# Patient Record
Sex: FEMALE | Race: WHITE | NOT HISPANIC OR LATINO
[De-identification: names, ages, dates, MRNs, and addresses within clinical notes are randomized per-mention and may not be internally consistent; named-entity substitution may affect disease eponyms.]

---

## 2021-01-08 ENCOUNTER — TRANSCRIPTION ENCOUNTER (OUTPATIENT)
Age: 31
End: 2021-01-08

## 2021-06-04 ENCOUNTER — TRANSCRIPTION ENCOUNTER (OUTPATIENT)
Age: 31
End: 2021-06-04

## 2021-10-08 ENCOUNTER — NON-APPOINTMENT (OUTPATIENT)
Age: 31
End: 2021-10-08

## 2021-10-08 ENCOUNTER — APPOINTMENT (OUTPATIENT)
Dept: FAMILY MEDICINE | Facility: CLINIC | Age: 31
End: 2021-10-08
Payer: COMMERCIAL

## 2021-10-08 VITALS
BODY MASS INDEX: 29.64 KG/M2 | HEART RATE: 82 BPM | RESPIRATION RATE: 15 BRPM | OXYGEN SATURATION: 98 % | WEIGHT: 151 LBS | DIASTOLIC BLOOD PRESSURE: 82 MMHG | HEIGHT: 60 IN | TEMPERATURE: 97.4 F | SYSTOLIC BLOOD PRESSURE: 120 MMHG

## 2021-10-08 DIAGNOSIS — Z80.0 FAMILY HISTORY OF MALIGNANT NEOPLASM OF DIGESTIVE ORGANS: ICD-10-CM

## 2021-10-08 DIAGNOSIS — Z12.4 ENCOUNTER FOR SCREENING FOR MALIGNANT NEOPLASM OF CERVIX: ICD-10-CM

## 2021-10-08 DIAGNOSIS — I49.49 OTHER PREMATURE DEPOLARIZATION: ICD-10-CM

## 2021-10-08 DIAGNOSIS — Z80.42 FAMILY HISTORY OF MALIGNANT NEOPLASM OF PROSTATE: ICD-10-CM

## 2021-10-08 DIAGNOSIS — Z60.2 PROBLEMS RELATED TO LIVING ALONE: ICD-10-CM

## 2021-10-08 DIAGNOSIS — Z80.6 FAMILY HISTORY OF LEUKEMIA: ICD-10-CM

## 2021-10-08 DIAGNOSIS — F41.9 ANXIETY DISORDER, UNSPECIFIED: ICD-10-CM

## 2021-10-08 DIAGNOSIS — Z11.3 ENCOUNTER FOR SCREENING FOR INFECTIONS WITH A PREDOMINANTLY SEXUAL MODE OF TRANSMISSION: ICD-10-CM

## 2021-10-08 DIAGNOSIS — Z80.1 FAMILY HISTORY OF MALIGNANT NEOPLASM OF TRACHEA, BRONCHUS AND LUNG: ICD-10-CM

## 2021-10-08 DIAGNOSIS — R53.83 OTHER FATIGUE: ICD-10-CM

## 2021-10-08 DIAGNOSIS — Z80.8 FAMILY HISTORY OF MALIGNANT NEOPLASM OF OTHER ORGANS OR SYSTEMS: ICD-10-CM

## 2021-10-08 DIAGNOSIS — K64.4 RESIDUAL HEMORRHOIDAL SKIN TAGS: ICD-10-CM

## 2021-10-08 PROBLEM — Z00.00 ENCOUNTER FOR PREVENTIVE HEALTH EXAMINATION: Status: ACTIVE | Noted: 2021-10-08

## 2021-10-08 PROCEDURE — 99204 OFFICE O/P NEW MOD 45 MIN: CPT | Mod: 25

## 2021-10-08 PROCEDURE — 93000 ELECTROCARDIOGRAM COMPLETE: CPT

## 2021-10-08 RX ORDER — BUPROPION HYDROCHLORIDE 150 MG/1
150 TABLET, FILM COATED ORAL
Refills: 0 | Status: ACTIVE | COMMUNITY

## 2021-10-08 SDOH — SOCIAL STABILITY - SOCIAL INSECURITY: PROBLEMS RELATED TO LIVING ALONE: Z60.2

## 2021-10-08 NOTE — HISTORY OF PRESENT ILLNESS
[FreeTextEntry8] : Here to establish. She has h/o anxiety and is being seen by psychiatrist dr. Ashford. She is AAOX3,NAD. She owns [employer]. She has external hemorrhoid and has off and on vaginal discharge . No pelvic pain . She needs to establish with GYN. She has left sided hip pain for few weeks. She has seen pain management and had xray and MRI left hip . She is doing Yoga and Pilates, LidocaIne topically helps along with Ice.

## 2021-10-08 NOTE — HEALTH RISK ASSESSMENT
[No] : In the past 12 months have you used drugs other than those required for medical reasons? No [No falls in past year] : Patient reported no falls in the past year [0] : 2) Feeling down, depressed, or hopeless: Not at all (0) [] : No [de-identified] : walking, yoga  [de-identified] : vegetarian  [[id number]] : 0

## 2021-10-11 LAB
ALBUMIN SERPL ELPH-MCNC: 4.8 G/DL
ALP BLD-CCNC: 47 U/L
ALT SERPL-CCNC: 11 U/L
ANION GAP SERPL CALC-SCNC: 17 MMOL/L
APPEARANCE: CLEAR
AST SERPL-CCNC: 9 U/L
BACTERIA: NEGATIVE
BASOPHILS # BLD AUTO: 0.06 K/UL
BASOPHILS NFR BLD AUTO: 0.8 %
BILIRUB SERPL-MCNC: 0.9 MG/DL
BILIRUBIN URINE: NEGATIVE
BLOOD URINE: NEGATIVE
BUN SERPL-MCNC: 13 MG/DL
CALCIUM SERPL-MCNC: 9.4 MG/DL
CHLORIDE SERPL-SCNC: 101 MMOL/L
CHOLEST SERPL-MCNC: 169 MG/DL
CO2 SERPL-SCNC: 20 MMOL/L
COLOR: YELLOW
CREAT SERPL-MCNC: 0.68 MG/DL
EOSINOPHIL # BLD AUTO: 0.08 K/UL
EOSINOPHIL NFR BLD AUTO: 1 %
ESTIMATED AVERAGE GLUCOSE: 100 MG/DL
FERRITIN SERPL-MCNC: 14 NG/ML
FOLATE SERPL-MCNC: 14 NG/ML
GLUCOSE QUALITATIVE U: NEGATIVE
GLUCOSE SERPL-MCNC: 82 MG/DL
HBA1C MFR BLD HPLC: 5.1 %
HCT VFR BLD CALC: 38.2 %
HCV AB SER QL: NONREACTIVE
HCV S/CO RATIO: 0.16 S/CO
HDLC SERPL-MCNC: 66 MG/DL
HGB BLD-MCNC: 12.1 G/DL
HIV1+2 AB SPEC QL IA.RAPID: NONREACTIVE
HYALINE CASTS: 3 /LPF
IMM GRANULOCYTES NFR BLD AUTO: 0.1 %
IRON SATN MFR SERPL: 36 %
IRON SERPL-MCNC: 108 UG/DL
KETONES URINE: ABNORMAL
LDLC SERPL CALC-MCNC: 93 MG/DL
LEUKOCYTE ESTERASE URINE: NEGATIVE
LYMPHOCYTES # BLD AUTO: 2.04 K/UL
LYMPHOCYTES NFR BLD AUTO: 25.6 %
MAGNESIUM SERPL-MCNC: 2.1 MG/DL
MAN DIFF?: NORMAL
MCHC RBC-ENTMCNC: 28.3 PG
MCHC RBC-ENTMCNC: 31.7 GM/DL
MCV RBC AUTO: 89.5 FL
MICROSCOPIC-UA: NORMAL
MONOCYTES # BLD AUTO: 0.57 K/UL
MONOCYTES NFR BLD AUTO: 7.1 %
NEUTROPHILS # BLD AUTO: 5.22 K/UL
NEUTROPHILS NFR BLD AUTO: 65.4 %
NITRITE URINE: NEGATIVE
NONHDLC SERPL-MCNC: 102 MG/DL
PH URINE: 6
PLATELET # BLD AUTO: 332 K/UL
POTASSIUM SERPL-SCNC: 4.6 MMOL/L
PROT SERPL-MCNC: 7.1 G/DL
PROTEIN URINE: NEGATIVE
RBC # BLD: 4.27 M/UL
RBC # FLD: 13.3 %
RED BLOOD CELLS URINE: 1 /HPF
SODIUM SERPL-SCNC: 139 MMOL/L
SPECIFIC GRAVITY URINE: 1.03
SQUAMOUS EPITHELIAL CELLS: 11 /HPF
TIBC SERPL-MCNC: 299 UG/DL
TRIGL SERPL-MCNC: 45 MG/DL
TSH SERPL-ACNC: 1.56 UIU/ML
UIBC SERPL-MCNC: 191 UG/DL
UROBILINOGEN URINE: NORMAL
VIT B12 SERPL-MCNC: 264 PG/ML
WBC # FLD AUTO: 7.98 K/UL
WHITE BLOOD CELLS URINE: 9 /HPF

## 2021-10-14 ENCOUNTER — APPOINTMENT (OUTPATIENT)
Dept: FAMILY MEDICINE | Facility: CLINIC | Age: 31
End: 2021-10-14
Payer: COMMERCIAL

## 2021-10-14 ENCOUNTER — MED ADMIN CHARGE (OUTPATIENT)
Age: 31
End: 2021-10-14

## 2021-10-14 PROCEDURE — 96372 THER/PROPH/DIAG INJ SC/IM: CPT

## 2021-10-14 RX ORDER — CYANOCOBALAMIN 1000 UG/ML
1000 INJECTION INTRAMUSCULAR; SUBCUTANEOUS
Qty: 0 | Refills: 0 | Status: COMPLETED | OUTPATIENT
Start: 2021-10-14

## 2021-10-14 RX ADMIN — CYANOCOBALAMIN 0 MCG/ML: 1000 INJECTION, SOLUTION INTRAMUSCULAR at 00:00

## 2021-10-21 ENCOUNTER — APPOINTMENT (OUTPATIENT)
Dept: FAMILY MEDICINE | Facility: CLINIC | Age: 31
End: 2021-10-21
Payer: COMMERCIAL

## 2021-10-21 ENCOUNTER — MED ADMIN CHARGE (OUTPATIENT)
Age: 31
End: 2021-10-21

## 2021-10-21 PROCEDURE — 96372 THER/PROPH/DIAG INJ SC/IM: CPT

## 2021-10-21 RX ORDER — CYANOCOBALAMIN 1000 UG/ML
1000 INJECTION INTRAMUSCULAR; SUBCUTANEOUS
Qty: 0 | Refills: 0 | Status: COMPLETED | OUTPATIENT
Start: 2021-10-21

## 2021-10-21 RX ADMIN — CYANOCOBALAMIN 0 MCG/ML: 1000 INJECTION, SOLUTION INTRAMUSCULAR at 00:00

## 2021-10-27 ENCOUNTER — APPOINTMENT (OUTPATIENT)
Dept: FAMILY MEDICINE | Facility: CLINIC | Age: 31
End: 2021-10-27

## 2021-11-11 ENCOUNTER — APPOINTMENT (OUTPATIENT)
Dept: FAMILY MEDICINE | Facility: CLINIC | Age: 31
End: 2021-11-11
Payer: COMMERCIAL

## 2021-11-11 ENCOUNTER — NON-APPOINTMENT (OUTPATIENT)
Age: 31
End: 2021-11-11

## 2021-11-11 DIAGNOSIS — D64.9 ANEMIA, UNSPECIFIED: ICD-10-CM

## 2021-11-11 DIAGNOSIS — E53.8 DEFICIENCY OF OTHER SPECIFIED B GROUP VITAMINS: ICD-10-CM

## 2021-11-11 PROCEDURE — 96372 THER/PROPH/DIAG INJ SC/IM: CPT

## 2021-11-12 ENCOUNTER — APPOINTMENT (OUTPATIENT)
Dept: OBGYN | Facility: CLINIC | Age: 31
End: 2021-11-12

## 2021-11-15 ENCOUNTER — APPOINTMENT (OUTPATIENT)
Dept: SURGERY | Facility: CLINIC | Age: 31
End: 2021-11-15

## 2021-11-19 ENCOUNTER — NON-APPOINTMENT (OUTPATIENT)
Age: 31
End: 2021-11-19

## 2021-11-19 ENCOUNTER — APPOINTMENT (OUTPATIENT)
Dept: OBGYN | Facility: CLINIC | Age: 31
End: 2021-11-19
Payer: COMMERCIAL

## 2021-11-19 ENCOUNTER — APPOINTMENT (OUTPATIENT)
Dept: FAMILY MEDICINE | Facility: CLINIC | Age: 31
End: 2021-11-19
Payer: COMMERCIAL

## 2021-11-19 VITALS
SYSTOLIC BLOOD PRESSURE: 110 MMHG | HEIGHT: 62 IN | HEART RATE: 75 BPM | TEMPERATURE: 97.3 F | BODY MASS INDEX: 28.16 KG/M2 | DIASTOLIC BLOOD PRESSURE: 70 MMHG | OXYGEN SATURATION: 98 % | WEIGHT: 153 LBS

## 2021-11-19 DIAGNOSIS — Z01.419 ENCOUNTER FOR GYNECOLOGICAL EXAMINATION (GENERAL) (ROUTINE) W/OUT ABNORMAL FINDINGS: ICD-10-CM

## 2021-11-19 DIAGNOSIS — N39.0 URINARY TRACT INFECTION, SITE NOT SPECIFIED: ICD-10-CM

## 2021-11-19 DIAGNOSIS — N94.10 UNSPECIFIED DYSPAREUNIA: ICD-10-CM

## 2021-11-19 PROCEDURE — 99385 PREV VISIT NEW AGE 18-39: CPT

## 2021-11-19 PROCEDURE — 96372 THER/PROPH/DIAG INJ SC/IM: CPT

## 2021-11-19 RX ORDER — CYANOCOBALAMIN 1000 UG/ML
1000 INJECTION INTRAMUSCULAR; SUBCUTANEOUS
Qty: 0 | Refills: 0 | Status: COMPLETED | OUTPATIENT
Start: 2021-11-19

## 2021-11-19 RX ORDER — DESVENLAFAXINE SUCCINATE 25 MG/1
25 TABLET, EXTENDED RELEASE ORAL
Refills: 0 | Status: DISCONTINUED | COMMUNITY
End: 2021-11-19

## 2021-11-19 RX ORDER — IBUPROFEN 200 MG/1
TABLET, COATED ORAL
Refills: 0 | Status: DISCONTINUED | COMMUNITY
End: 2021-11-19

## 2021-11-19 RX ADMIN — CYANOCOBALAMIN 0 MCG/ML: 1000 INJECTION INTRAMUSCULAR; SUBCUTANEOUS at 00:00
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
## 2021-11-19 NOTE — PLAN
[FreeTextEntry1] : \par \par pt to return for first gardasil injection\par \par I spent the time noted on the day of this patient encounter preparing for, providing and documenting the above E/M service and counseling and educate patient on differential, workup, disease course, and treatment/management. Education was provided to the patient during this encounter. All questions and concerns were answered and addressed in detail.\par \par Zulay Pierre MD\par

## 2021-11-19 NOTE — REVIEW OF SYSTEMS
[Pelvic pain] : pelvic pain [Genital Rash/Irritation] : genital rash/irritation [Negative] : Gastrointestinal

## 2021-11-22 LAB
C TRACH RRNA SPEC QL NAA+PROBE: NOT DETECTED
HBV SURFACE AG SER QL: NONREACTIVE
HCV RNA SERPL NAA DL=5-ACNC: NOT DETECTED IU/ML
HCV RNA SERPL NAA+PROBE-LOG IU: NOT DETECTED LOG10IU/ML
HIV1+2 AB SPEC QL IA.RAPID: NONREACTIVE
N GONORRHOEA RRNA SPEC QL NAA+PROBE: NOT DETECTED
SOURCE AMPLIFICATION: NORMAL
T PALLIDUM AB SER QL IA: NEGATIVE

## 2021-11-29 ENCOUNTER — APPOINTMENT (OUTPATIENT)
Dept: SURGERY | Facility: CLINIC | Age: 31
End: 2021-11-29

## 2021-11-30 ENCOUNTER — NON-APPOINTMENT (OUTPATIENT)
Age: 31
End: 2021-11-30

## 2021-12-03 ENCOUNTER — APPOINTMENT (OUTPATIENT)
Dept: FAMILY MEDICINE | Facility: CLINIC | Age: 31
End: 2021-12-03

## 2021-12-09 ENCOUNTER — APPOINTMENT (OUTPATIENT)
Dept: ULTRASOUND IMAGING | Facility: HOSPITAL | Age: 31
End: 2021-12-09

## 2021-12-16 ENCOUNTER — OUTPATIENT (OUTPATIENT)
Dept: OUTPATIENT SERVICES | Facility: HOSPITAL | Age: 31
LOS: 1 days | Discharge: HOME | End: 2021-12-16

## 2021-12-16 DIAGNOSIS — D64.9 ANEMIA, UNSPECIFIED: ICD-10-CM

## 2021-12-16 DIAGNOSIS — E61.1 IRON DEFICIENCY: ICD-10-CM

## 2021-12-17 ENCOUNTER — APPOINTMENT (OUTPATIENT)
Dept: FAMILY MEDICINE | Facility: CLINIC | Age: 31
End: 2021-12-17

## 2021-12-17 DIAGNOSIS — N76.0 ACUTE VAGINITIS: ICD-10-CM

## 2021-12-17 DIAGNOSIS — B96.89 ACUTE VAGINITIS: ICD-10-CM

## 2021-12-17 LAB
CANDIDA VAG CYTO: NOT DETECTED
CYTOLOGY CVX/VAG DOC THIN PREP: ABNORMAL
G VAGINALIS+PREV SP MTYP VAG QL MICRO: DETECTED
HPV HIGH+LOW RISK DNA PNL CVX: DETECTED
T VAGINALIS VAG QL WET PREP: NOT DETECTED

## 2021-12-20 ENCOUNTER — RESULT REVIEW (OUTPATIENT)
Age: 31
End: 2021-12-20

## 2021-12-20 ENCOUNTER — NON-APPOINTMENT (OUTPATIENT)
Age: 31
End: 2021-12-20

## 2021-12-20 ENCOUNTER — APPOINTMENT (OUTPATIENT)
Age: 31
End: 2021-12-20
Payer: COMMERCIAL

## 2021-12-20 VITALS
HEIGHT: 61.26 IN | HEART RATE: 71 BPM | BODY MASS INDEX: 28.88 KG/M2 | DIASTOLIC BLOOD PRESSURE: 72 MMHG | OXYGEN SATURATION: 99 % | WEIGHT: 154.96 LBS | TEMPERATURE: 97.9 F | SYSTOLIC BLOOD PRESSURE: 113 MMHG | RESPIRATION RATE: 14 BRPM

## 2021-12-20 DIAGNOSIS — R79.0 ABNORMAL LVL OF BLOOD MINERAL: ICD-10-CM

## 2021-12-20 LAB
BASOPHILS # BLD AUTO: 0.06 K/UL — SIGNIFICANT CHANGE UP (ref 0–0.2)
BASOPHILS NFR BLD AUTO: 1 % — SIGNIFICANT CHANGE UP (ref 0–2)
EOSINOPHIL # BLD AUTO: 0.06 K/UL — SIGNIFICANT CHANGE UP (ref 0–0.5)
EOSINOPHIL NFR BLD AUTO: 1 % — SIGNIFICANT CHANGE UP (ref 0–6)
HCT VFR BLD CALC: 42.1 % — SIGNIFICANT CHANGE UP (ref 34.5–45)
HGB BLD-MCNC: 13.4 G/DL — SIGNIFICANT CHANGE UP (ref 11.5–15.5)
IMM GRANULOCYTES NFR BLD AUTO: 0.2 % — SIGNIFICANT CHANGE UP (ref 0–1.5)
LYMPHOCYTES # BLD AUTO: 1.31 K/UL — SIGNIFICANT CHANGE UP (ref 1–3.3)
LYMPHOCYTES # BLD AUTO: 21.8 % — SIGNIFICANT CHANGE UP (ref 13–44)
MCHC RBC-ENTMCNC: 28.4 PG — SIGNIFICANT CHANGE UP (ref 27–34)
MCHC RBC-ENTMCNC: 31.8 G/DL — LOW (ref 32–36)
MCV RBC AUTO: 89.2 FL — SIGNIFICANT CHANGE UP (ref 80–100)
MONOCYTES # BLD AUTO: 0.42 K/UL — SIGNIFICANT CHANGE UP (ref 0–0.9)
MONOCYTES NFR BLD AUTO: 7 % — SIGNIFICANT CHANGE UP (ref 2–14)
NEUTROPHILS # BLD AUTO: 4.14 K/UL — SIGNIFICANT CHANGE UP (ref 1.8–7.4)
NEUTROPHILS NFR BLD AUTO: 69 % — SIGNIFICANT CHANGE UP (ref 43–77)
NRBC # BLD: 0 /100 WBCS — SIGNIFICANT CHANGE UP (ref 0–0)
PLATELET # BLD AUTO: 331 K/UL — SIGNIFICANT CHANGE UP (ref 150–400)
RBC # BLD: 4.72 M/UL — SIGNIFICANT CHANGE UP (ref 3.8–5.2)
RBC # FLD: 12.8 % — SIGNIFICANT CHANGE UP (ref 10.3–14.5)
WBC # BLD: 6 K/UL — SIGNIFICANT CHANGE UP (ref 3.8–10.5)
WBC # FLD AUTO: 6 K/UL — SIGNIFICANT CHANGE UP (ref 3.8–10.5)

## 2021-12-20 PROCEDURE — 99204 OFFICE O/P NEW MOD 45 MIN: CPT

## 2021-12-20 NOTE — CONSULT LETTER
[Dear  ___] : Dear ~ANTON, [Consult Letter:] : I had the pleasure of evaluating your patient, [unfilled]. [Consult Closing:] : Thank you very much for allowing me to participate in the care of this patient.  If you have any questions, please do not hesitate to contact me. [Please see my note below.] : Please see my note below. [Sincerely,] : Sincerely, [FreeTextEntry2] : Shelbi Moody MD [FreeTextEntry3] : СЕРГЕЙ AYON M.D.\par Hematology/ Oncology\par [facility] \par [facility]\par [address]\par [place], [place] [address]\par Telephone: [phone]\par Fax: [phone]\par \par \par \par \par \par

## 2021-12-20 NOTE — HISTORY OF PRESENT ILLNESS
[de-identified] : Ms. CYR is a 31 premenopausal female, never smoker, vegetarian, with PMHx of external hemorrhoids, recent diagnosis of bacterial vaginosis, anxiety/ depression since adolescence (under the care of Dr. Ashford–psychiatry), referred for hematologic consultation of anemia with low ferritin level ( hemoglobin 12 g/dL, serum ferritin 14 ng/mL). Patient is endorsing regular menses of normal amount of blood loss, no associated dizziness or fatigue. She is not on oral iron supplementation. Denies recent PRBC transfusion or parenteral iron supplementation.  Did not have EGD/ colonoscopy ; reports family history of leukemia in one uncle, and significant psychiatric history (mother and maternal aunt with bipolar disorder, father with anxiety, a twin sister with schizophrenia).  Patient is single, works as a , denies alcohol or drug use.  Today's hemoglobin at 13.4 g/dL. [FreeTextEntry1] : expectant surveillance\par \par

## 2021-12-20 NOTE — ASSESSMENT
[FreeTextEntry1] : Ms. CYR 's questions were answered to her satisfaction. \par She  expressed her  understanding and willingness to comply with the above recommendations, and  will follow up in PCP office.\par \par \par \par \par

## 2021-12-20 NOTE — PHYSICAL EXAM
[Fully active, able to carry on all pre-disease performance without restriction] : Status 0 - Fully active, able to carry on all pre-disease performance without restriction [Normal] : grossly intact [de-identified] : depression/anxiety

## 2021-12-21 LAB — FERRITIN SERPL-MCNC: 16 NG/ML

## 2021-12-22 ENCOUNTER — APPOINTMENT (OUTPATIENT)
Dept: OBGYN | Facility: CLINIC | Age: 31
End: 2021-12-22
Payer: COMMERCIAL

## 2021-12-22 VITALS
OXYGEN SATURATION: 98 % | BODY MASS INDEX: 29.44 KG/M2 | DIASTOLIC BLOOD PRESSURE: 70 MMHG | WEIGHT: 160 LBS | TEMPERATURE: 97.3 F | HEART RATE: 86 BPM | HEIGHT: 62 IN | SYSTOLIC BLOOD PRESSURE: 110 MMHG

## 2021-12-22 DIAGNOSIS — R87.611 ATYPICAL SQUAMOUS CELLS CANNOT EXCLUDE HIGH GRADE SQUAMOUS INTRAEPITHELIAL LESION ON CYTOLOGIC SMEAR OF CERVIX (ASC-H): ICD-10-CM

## 2021-12-22 DIAGNOSIS — Z71.89 OTHER SPECIFIED COUNSELING: ICD-10-CM

## 2021-12-22 PROCEDURE — 99214 OFFICE O/P EST MOD 30 MIN: CPT | Mod: 25

## 2021-12-22 PROCEDURE — 57454 BX/CURETT OF CERVIX W/SCOPE: CPT

## 2021-12-22 NOTE — PLAN
[FreeTextEntry1] : - Post procedure counseling given--patient to practice pelvic rest for 1 week, this includes nothing in the vagina, no submerging the vagina in water, no douching, no tampons\par - pt to return for first dose of HPV vax\par \par I spent the time noted on the day of this patient encounter preparing for, providing and documenting the above E/M service and counseling and educate patient on differential, workup, disease course, and treatment/management. Education was provided to the patient during this encounter. All questions and concerns were answered and addressed in detail.\par \par Zulay Pierre MD\par

## 2021-12-22 NOTE — HISTORY OF PRESENT ILLNESS
[FreeTextEntry1] : 32 yo here today for colposcopy for ASC-H/HPV+ pap. Patient admits to having a LEEP procedure in 2018 for HGSIL. Spoke with patient about HPV vaccine, interested.

## 2021-12-22 NOTE — COUNSELING
[Confidentiality] : confidentiality [STD (testing, results, tx)] : STD (testing, results, tx) [HPV Vaccine] : HPV Vaccine [Medication Management] : medication management [Pre/Post Op Instructions] : pre/post op instructions

## 2021-12-22 NOTE — PROCEDURE
[Colposcopy] : Colposcopy  [Time out performed] : Pre-procedure time out performed.  Patient's name, date of birth and procedure confirmed. [Consent Obtained] : Consent obtained [Risks] : risks [Benefits] : benefits [Alternatives] : alternatives [Patient] : patient [Infection] : infection [Bleeding] : bleeding [Allergic Reaction] : allergic reaction [ASCUS] : ASCUS [HPV High Risk] : HPV high risk [No Premedication] : no premedication [Colposcopy Adequate] : colposcopy adequate [SCI Fully Visualized] : SCI fully visualized [ECC Performed] : ECC performed [No Abnormalities] : no abnormalities [Lesion] : lesion seen [Hemostasis Obtained] : Hemostasis obtained [Tolerated Well] : the patient tolerated the procedure well [Biopsy] : biopsy not taken [de-identified] : ASCUS- cannot ro HGSIL [de-identified] : 3 [de-identified] : Acetowhite lesions noted at 11, 1 and 6 oclock [de-identified] : 11, 1, and 6 oclock [de-identified] : silver nitrate and monsels applied to cervix

## 2021-12-23 LAB
HGB A MFR BLD: 97.2 %
HGB A2 MFR BLD: 2.8 %
HGB FRACT BLD-IMP: NORMAL

## 2022-01-07 LAB — CORE LAB BIOPSY: NORMAL

## 2022-02-03 DIAGNOSIS — Z41.9 ENCOUNTER FOR PROCEDURE FOR PURPOSES OTHER THAN REMEDYING HEALTH STATE, UNSPECIFIED: ICD-10-CM

## 2022-03-07 ENCOUNTER — APPOINTMENT (OUTPATIENT)
Dept: SURGERY | Facility: CLINIC | Age: 32
End: 2022-03-07

## 2022-03-21 ENCOUNTER — APPOINTMENT (OUTPATIENT)
Dept: OBGYN | Facility: HOSPITAL | Age: 32
End: 2022-03-21

## 2022-04-05 ENCOUNTER — APPOINTMENT (OUTPATIENT)
Dept: OBGYN | Facility: CLINIC | Age: 32
End: 2022-04-05

## 2022-04-07 ENCOUNTER — NON-APPOINTMENT (OUTPATIENT)
Age: 32
End: 2022-04-07

## 2022-04-26 ENCOUNTER — OUTPATIENT (OUTPATIENT)
Dept: OUTPATIENT SERVICES | Facility: HOSPITAL | Age: 32
LOS: 1 days | End: 2022-04-26
Payer: COMMERCIAL

## 2022-04-26 VITALS
DIASTOLIC BLOOD PRESSURE: 74 MMHG | WEIGHT: 171.08 LBS | RESPIRATION RATE: 18 BRPM | SYSTOLIC BLOOD PRESSURE: 116 MMHG | HEIGHT: 62 IN | TEMPERATURE: 98 F | OXYGEN SATURATION: 99 % | HEART RATE: 70 BPM

## 2022-04-26 DIAGNOSIS — F41.9 ANXIETY DISORDER, UNSPECIFIED: ICD-10-CM

## 2022-04-26 DIAGNOSIS — Z01.818 ENCOUNTER FOR OTHER PREPROCEDURAL EXAMINATION: ICD-10-CM

## 2022-04-26 DIAGNOSIS — R87.613 HIGH GRADE SQUAMOUS INTRAEPITHELIAL LESION ON CYTOLOGIC SMEAR OF CERVIX (HGSIL): ICD-10-CM

## 2022-04-26 DIAGNOSIS — Z98.890 OTHER SPECIFIED POSTPROCEDURAL STATES: Chronic | ICD-10-CM

## 2022-04-26 LAB
HCG SERPL-ACNC: <1 MIU/ML — SIGNIFICANT CHANGE UP
HCT VFR BLD CALC: 38.6 % — SIGNIFICANT CHANGE UP (ref 34.5–45)
HGB BLD-MCNC: 12.4 G/DL — SIGNIFICANT CHANGE UP (ref 11.5–15.5)
MCHC RBC-ENTMCNC: 27.7 PG — SIGNIFICANT CHANGE UP (ref 27–34)
MCHC RBC-ENTMCNC: 32.1 GM/DL — SIGNIFICANT CHANGE UP (ref 32–36)
MCV RBC AUTO: 86.2 FL — SIGNIFICANT CHANGE UP (ref 80–100)
NRBC # BLD: 0 /100 WBCS — SIGNIFICANT CHANGE UP (ref 0–0)
PLATELET # BLD AUTO: 334 K/UL — SIGNIFICANT CHANGE UP (ref 150–400)
RBC # BLD: 4.48 M/UL — SIGNIFICANT CHANGE UP (ref 3.8–5.2)
RBC # FLD: 12.9 % — SIGNIFICANT CHANGE UP (ref 10.3–14.5)
WBC # BLD: 7.34 K/UL — SIGNIFICANT CHANGE UP (ref 3.8–10.5)
WBC # FLD AUTO: 7.34 K/UL — SIGNIFICANT CHANGE UP (ref 3.8–10.5)

## 2022-04-26 PROCEDURE — 36415 COLL VENOUS BLD VENIPUNCTURE: CPT

## 2022-04-26 PROCEDURE — 84702 CHORIONIC GONADOTROPIN TEST: CPT

## 2022-04-26 PROCEDURE — 85027 COMPLETE CBC AUTOMATED: CPT

## 2022-04-26 PROCEDURE — G0463: CPT

## 2022-04-26 NOTE — H&P PST ADULT - PROBLEM SELECTOR PLAN 1
Scheduled for LEEP procedure with Dr. Pierre on 05/02/2022.  COVID-19 pcr scheduled for 04/29/2022.  Pre op instructions given and patient verbalized understanding.  CBC, BMP, EKG and medical clearance pending.  NPO after midnight night before procedure.  To stop all ASA, NSAIDs, vitamins and supplements 1 week prior to procedure. Scheduled for LEEP procedure with Dr. Pierre on 05/02/2022.  COVID-19 pcr scheduled for 04/29/2022.  Pre op instructions given and patient verbalized understanding.  CBC, HCG pending.  NPO after midnight night before procedure.  To stop all ASA, NSAIDs, vitamins and supplements 1 week prior to procedure.

## 2022-04-26 NOTE — H&P PST ADULT - NSANTHOSAYNRD_GEN_A_CORE
neck 13.5 inches/No. MAHIN screening performed.  STOP BANG Legend: 0-2 = LOW Risk; 3-4 = INTERMEDIATE Risk; 5-8 = HIGH Risk

## 2022-04-26 NOTE — H&P PST ADULT - HISTORY OF PRESENT ILLNESS
30 y/o female with PMH of anxiety and anemia presents for PST.  Reports abnormal pap smear earlier this year and a colposcopy with abnormal cells.  Feeling well today at PST with no recent cough, fever, or illness.  Scheduled for LEEP procedure with Dr. Pierre on 05/02/2022.  COVID-19 pcr scheduled for 04/29/2022.

## 2022-04-26 NOTE — H&P PST ADULT - NSICDXPASTMEDICALHX_GEN_ALL_CORE_FT
PAST MEDICAL HISTORY:  Anemia     Anxiety     High grade squamous intraepithelial lesion of cervix 2017

## 2022-04-29 ENCOUNTER — OUTPATIENT (OUTPATIENT)
Dept: OUTPATIENT SERVICES | Facility: HOSPITAL | Age: 32
LOS: 1 days | End: 2022-04-29
Payer: COMMERCIAL

## 2022-04-29 DIAGNOSIS — Z98.890 OTHER SPECIFIED POSTPROCEDURAL STATES: Chronic | ICD-10-CM

## 2022-04-29 DIAGNOSIS — Z20.828 CONTACT WITH AND (SUSPECTED) EXPOSURE TO OTHER VIRAL COMMUNICABLE DISEASES: ICD-10-CM

## 2022-04-29 PROBLEM — F41.9 ANXIETY DISORDER, UNSPECIFIED: Chronic | Status: ACTIVE | Noted: 2022-04-26

## 2022-04-29 PROBLEM — D64.9 ANEMIA, UNSPECIFIED: Chronic | Status: ACTIVE | Noted: 2022-04-26

## 2022-04-29 PROBLEM — R87.613 HIGH GRADE SQUAMOUS INTRAEPITHELIAL LESION ON CYTOLOGIC SMEAR OF CERVIX (HGSIL): Chronic | Status: ACTIVE | Noted: 2022-04-26

## 2022-04-29 LAB — SARS-COV-2 RNA SPEC QL NAA+PROBE: SIGNIFICANT CHANGE UP

## 2022-04-29 PROCEDURE — U0005: CPT

## 2022-04-29 PROCEDURE — U0003: CPT

## 2022-05-01 ENCOUNTER — TRANSCRIPTION ENCOUNTER (OUTPATIENT)
Age: 32
End: 2022-05-01

## 2022-05-02 ENCOUNTER — OUTPATIENT (OUTPATIENT)
Dept: OUTPATIENT SERVICES | Facility: HOSPITAL | Age: 32
LOS: 1 days | End: 2022-05-02
Payer: COMMERCIAL

## 2022-05-02 ENCOUNTER — APPOINTMENT (OUTPATIENT)
Dept: OBGYN | Facility: HOSPITAL | Age: 32
End: 2022-05-02

## 2022-05-02 ENCOUNTER — RESULT REVIEW (OUTPATIENT)
Age: 32
End: 2022-05-02

## 2022-05-02 ENCOUNTER — TRANSCRIPTION ENCOUNTER (OUTPATIENT)
Age: 32
End: 2022-05-02

## 2022-05-02 VITALS
WEIGHT: 171.08 LBS | DIASTOLIC BLOOD PRESSURE: 76 MMHG | HEART RATE: 68 BPM | RESPIRATION RATE: 14 BRPM | OXYGEN SATURATION: 99 % | TEMPERATURE: 97 F | HEIGHT: 62 IN | SYSTOLIC BLOOD PRESSURE: 117 MMHG

## 2022-05-02 VITALS
OXYGEN SATURATION: 100 % | DIASTOLIC BLOOD PRESSURE: 79 MMHG | SYSTOLIC BLOOD PRESSURE: 116 MMHG | TEMPERATURE: 98 F | HEART RATE: 62 BPM | RESPIRATION RATE: 14 BRPM

## 2022-05-02 DIAGNOSIS — Z98.890 OTHER SPECIFIED POSTPROCEDURAL STATES: Chronic | ICD-10-CM

## 2022-05-02 DIAGNOSIS — R87.613 HIGH GRADE SQUAMOUS INTRAEPITHELIAL LESION ON CYTOLOGIC SMEAR OF CERVIX (HGSIL): ICD-10-CM

## 2022-05-02 PROCEDURE — 88307 TISSUE EXAM BY PATHOLOGIST: CPT

## 2022-05-02 PROCEDURE — 88307 TISSUE EXAM BY PATHOLOGIST: CPT | Mod: 26

## 2022-05-02 PROCEDURE — 57460 BX OF CERVIX W/SCOPE LEEP: CPT

## 2022-05-02 PROCEDURE — 57461 CONZ OF CERVIX W/SCOPE LEEP: CPT

## 2022-05-02 RX ORDER — MULTIVIT-MIN/FERROUS GLUCONATE 9 MG/15 ML
1 LIQUID (ML) ORAL
Qty: 0 | Refills: 0 | DISCHARGE

## 2022-05-02 RX ORDER — ONDANSETRON 8 MG/1
4 TABLET, FILM COATED ORAL ONCE
Refills: 0 | Status: COMPLETED | OUTPATIENT
Start: 2022-05-02 | End: 2022-05-02

## 2022-05-02 RX ORDER — SODIUM CHLORIDE 9 MG/ML
1000 INJECTION, SOLUTION INTRAVENOUS
Refills: 0 | Status: DISCONTINUED | OUTPATIENT
Start: 2022-05-02 | End: 2022-05-02

## 2022-05-02 RX ORDER — HYDROMORPHONE HYDROCHLORIDE 2 MG/ML
0.5 INJECTION INTRAMUSCULAR; INTRAVENOUS; SUBCUTANEOUS
Refills: 0 | Status: DISCONTINUED | OUTPATIENT
Start: 2022-05-02 | End: 2022-05-02

## 2022-05-02 RX ORDER — CLONAZEPAM 1 MG
1 TABLET ORAL
Qty: 0 | Refills: 0 | DISCHARGE

## 2022-05-02 RX ADMIN — SODIUM CHLORIDE 50 MILLILITER(S): 9 INJECTION, SOLUTION INTRAVENOUS at 06:48

## 2022-05-02 RX ADMIN — ONDANSETRON 4 MILLIGRAM(S): 8 TABLET, FILM COATED ORAL at 08:42

## 2022-05-02 NOTE — ASU DISCHARGE PLAN (ADULT/PEDIATRIC) - NURSING INSTRUCTIONS
Keep scopolamine patch on for 3 days. Eat light foods drink fluids. Follow up with MD as instructed call for any heavy bleeding, pain not relieved by Tylenol or Motrin or fever over 101 or foul smelling vaginal discharge.

## 2022-05-02 NOTE — ASU DISCHARGE PLAN (ADULT/PEDIATRIC) - CARE PROVIDER_API CALL
Zulay Pierre (MD; MS)  Obstetrics and Gynecology  [address]  [place] [address]  Phone: [phone]  Fax: [phone]  Follow Up Time:

## 2022-05-02 NOTE — ASU DISCHARGE PLAN (ADULT/PEDIATRIC) - ASU DC SPECIAL INSTRUCTIONSFT
This patient may be discharged from asu when crtieria is met   Follow up as scheduled in 2 weeks  COMPLETE PELVIC REST   No sex, nothing in the vagina, no tampons , no baths  shower only for 4 weeks  take tylenol, motrin for discomfort

## 2022-05-02 NOTE — ASU PATIENT PROFILE, ADULT - FALL HARM RISK - UNIVERSAL INTERVENTIONS
Bed in lowest position, wheels locked, appropriate side rails in place/Call bell, personal items and telephone in reach/Instruct patient to call for assistance before getting out of bed or chair/Non-slip footwear when patient is out of bed/[place] to call system/Physically safe environment - no spills, clutter or unnecessary equipment/Purposeful Proactive Rounding/Room/bathroom lighting operational, light cord in reach

## 2022-05-02 NOTE — BRIEF OPERATIVE NOTE - NSICDXBRIEFPREOP_GEN_ALL_CORE_FT
PRE-OP DIAGNOSIS:  High grade squamous intraepithelial lesion (HGSIL), grade 3 JUDIE, on biopsy of cervix 02-May-2022 08:33:22  Alyssa Beth

## 2022-05-02 NOTE — BRIEF OPERATIVE NOTE - NSICDXBRIEFPROCEDURE_GEN_ALL_CORE_FT
PROCEDURES:  LEEP procedure 02-May-2022 08:33:11  Alyssa Beth   PROCEDURES:  LEEP procedure 02-May-2022 08:33:11  Alyssa Beth  Colposcopy, cervix 02-May-2022 12:51:20  Zulay Pierre

## 2022-05-02 NOTE — ASU DISCHARGE PLAN (ADULT/PEDIATRIC) - CALL YOUR DOCTOR IF YOU HAVE ANY OF THE FOLLOWING:
Bleeding that does not stop Bleeding that does not stop/Fever greater than (need to indicate Fahrenheit or Celsius)/Nausea and vomiting that does not stop/Unable to urinate/Inability to tolerate liquids or foods

## 2022-05-06 LAB — SURGICAL PATHOLOGY STUDY: SIGNIFICANT CHANGE UP

## 2022-05-18 ENCOUNTER — APPOINTMENT (OUTPATIENT)
Dept: OBGYN | Facility: CLINIC | Age: 32
End: 2022-05-18
Payer: COMMERCIAL

## 2022-05-18 VITALS
OXYGEN SATURATION: 98 % | DIASTOLIC BLOOD PRESSURE: 62 MMHG | SYSTOLIC BLOOD PRESSURE: 102 MMHG | HEIGHT: 62 IN | WEIGHT: 165 LBS | BODY MASS INDEX: 30.36 KG/M2 | TEMPERATURE: 97.2 F | HEART RATE: 78 BPM

## 2022-05-18 DIAGNOSIS — Z98.890 OTHER SPECIFIED POSTPROCEDURAL STATES: ICD-10-CM

## 2022-05-18 PROCEDURE — 99213 OFFICE O/P EST LOW 20 MIN: CPT

## 2022-05-19 PROBLEM — Z98.890 HISTORY OF LOOP ELECTRICAL EXCISION PROCEDURE (LEEP): Status: ACTIVE | Noted: 2022-05-19

## 2022-05-19 NOTE — HISTORY OF PRESENT ILLNESS
[FreeTextEntry1] : 33 yo sp LEEP for HGSIL presents today for POV--co vaginal discharge--non pruritic, odorless.\par \par Path reviewed margins negative for dysplasia:\par \par Final Diagnosis\par \par \par 1. Ectocervix, 11:00, LEEP\par - Squamous epithelium with high-grade squamous intraepithelial lesion\par (HSIL; JUDIE 2-3).\par - The resection margins are negative for dysplasia.\par - Endocervical tissue with focal squamous metaplasia and\par microglandular hyperplasia.\par - Acute and chronic cervicitis.\par \par 2. Ectocervix, 1:00, LEEP\par - Benign squamous epithelium and endocervical tissue.\par - No dysplasia identified.\par \par 3. Ectocervix, inferior, LEEP\par - Benign squamous epithelium and endocervical tissue.\par - No dysplasia identified.\par \par 4. Cervix, top hat, LEEP\par - Benign endocervical tissue.\par - No dysplasia identified.\par

## 2022-05-19 NOTE — PLAN
[FreeTextEntry1] : \par \par Negative margins, fu 6 mo for colpo and pap, fu sooner for course of Gardasil\par \par I spent the time noted on the day of this patient encounter preparing for, providing and documenting the above E/M service and counseling and educate patient on differential, workup, disease course, and treatment/management. Education was provided to the patient during this encounter. All questions and concerns were answered and addressed in detail.\par \par Zulay Pierre MD\par

## 2022-05-19 NOTE — PHYSICAL EXAM
[Chaperone Present] : A chaperone was present in the examining room during all aspects of the physical examination [Appropriately responsive] : appropriately responsive [Alert] : alert [No Acute Distress] : no acute distress [Oriented x3] : oriented x3 [Labia Majora] : normal [Labia Minora] : normal [Normal] : normal [Uterine Adnexae] : normal [FreeTextEntry5] : well healing, no bleeding appreciated

## 2022-05-19 NOTE — REASON FOR VISIT
----- Message from Mary Parker sent at 1/2/2019  4:29 PM CST -----  Contact: Pt   Pt called and stated that her pre op clearance paper work needs to be faxed to the eye doctor's on [address] [facility]. Pt stated she does not know the doctor's name but the nurse should have the info. Pt had no clear info for me to give the nurse. She can be reached at [phone].    Thanks,  TF       
Pt was called and pre op forms were faxed off to [facility] per request. Pt has been notified./db**  
[Follow-Up] : a follow-up evaluation of

## 2022-05-25 ENCOUNTER — APPOINTMENT (OUTPATIENT)
Dept: OBGYN | Facility: CLINIC | Age: 32
End: 2022-05-25
Payer: COMMERCIAL

## 2022-05-25 VITALS
OXYGEN SATURATION: 98 % | HEART RATE: 80 BPM | WEIGHT: 165 LBS | BODY MASS INDEX: 30.36 KG/M2 | SYSTOLIC BLOOD PRESSURE: 110 MMHG | DIASTOLIC BLOOD PRESSURE: 76 MMHG | TEMPERATURE: 97.2 F | HEIGHT: 62 IN

## 2022-05-25 DIAGNOSIS — Z23 ENCOUNTER FOR IMMUNIZATION: ICD-10-CM

## 2022-05-25 LAB
A VAGINAE DNA VAG QL NAA+PROBE: ABNORMAL
BVAB2 DNA VAG QL NAA+PROBE: NORMAL
C KRUSEI DNA VAG QL NAA+PROBE: NEGATIVE
C TRACH RRNA SPEC QL NAA+PROBE: NEGATIVE
HCG UR QL: NEGATIVE
MEGA1 DNA VAG QL NAA+PROBE: ABNORMAL
N GONORRHOEA RRNA SPEC QL NAA+PROBE: NEGATIVE
QUALITY CONTROL: YES
T VAGINALIS RRNA SPEC QL NAA+PROBE: NEGATIVE

## 2022-05-25 PROCEDURE — 90651 9VHPV VACCINE 2/3 DOSE IM: CPT

## 2022-05-25 PROCEDURE — 99213 OFFICE O/P EST LOW 20 MIN: CPT | Mod: 25

## 2022-05-25 PROCEDURE — 90471 IMMUNIZATION ADMIN: CPT

## 2022-05-25 NOTE — PLAN
[FreeTextEntry1] : \par \par I spent the time noted on the day of this patient encounter preparing for, providing and documenting the above E/M service and counseling and educate patient on differential, workup, disease course, and treatment/management. Education was provided to the patient during this encounter. All questions and concerns were answered and addressed in detail.\par \par Zulay Pierre MD\par

## 2022-05-25 NOTE — HISTORY OF PRESENT ILLNESS
[FreeTextEntry1] : 31 yo with recent LEEP procedure presenting today for first dose of gardasil. BV on last visit culture. To escribe metronidazole.

## 2022-05-25 NOTE — COUNSELING
[Confidentiality] : confidentiality [STD (testing, results, tx)] : STD (testing, results, tx) [HPV Vaccine] : HPV Vaccine [Lab Results] : lab results

## 2022-08-10 ENCOUNTER — APPOINTMENT (OUTPATIENT)
Dept: OBGYN | Facility: CLINIC | Age: 32
End: 2022-08-10

## 2022-08-10 VITALS
WEIGHT: 165 LBS | SYSTOLIC BLOOD PRESSURE: 110 MMHG | DIASTOLIC BLOOD PRESSURE: 70 MMHG | HEART RATE: 86 BPM | TEMPERATURE: 97 F | OXYGEN SATURATION: 99 % | BODY MASS INDEX: 30.36 KG/M2 | HEIGHT: 62 IN

## 2022-08-10 DIAGNOSIS — B97.7 PAPILLOMAVIRUS AS THE CAUSE OF DISEASES CLASSIFIED ELSEWHERE: ICD-10-CM

## 2022-08-10 LAB
HCG UR QL: NEGATIVE
QUALITY CONTROL: YES

## 2022-08-10 PROCEDURE — 90651 9VHPV VACCINE 2/3 DOSE IM: CPT

## 2022-08-10 PROCEDURE — 99213 OFFICE O/P EST LOW 20 MIN: CPT | Mod: 25

## 2022-08-10 PROCEDURE — 90471 IMMUNIZATION ADMIN: CPT

## 2022-08-19 ENCOUNTER — NON-APPOINTMENT (OUTPATIENT)
Age: 32
End: 2022-08-19

## 2022-08-29 PROBLEM — B97.7 HIGH RISK HPV INFECTION: Status: ACTIVE | Noted: 2022-08-29

## 2022-08-29 NOTE — HISTORY OF PRESENT ILLNESS
[FreeTextEntry1] : 33 yo with history of HGSIL pap sp LEEP presents today for second dose of gardasil. No complaints, starting to work for [employer] soon.\par \par [id number]\par 12/9/23\par \par Colpo and pap in December

## 2022-08-29 NOTE — PLAN
[FreeTextEntry1] : \par \par Fu 4- 6 mo for pap/colpo\par \par I spent the time noted on the day of this patient encounter preparing for, providing and documenting the above E/M service and counseling and educate patient on differential, workup, disease course, and treatment/management. Education was provided to the patient during this encounter. All questions and concerns were answered and addressed in detail.\par \par Zulay Pierre MD\par

## 2022-12-12 ENCOUNTER — APPOINTMENT (OUTPATIENT)
Dept: OBGYN | Facility: CLINIC | Age: 32
End: 2022-12-12
Payer: COMMERCIAL

## 2022-12-12 VITALS
OXYGEN SATURATION: 98 % | BODY MASS INDEX: 33.13 KG/M2 | TEMPERATURE: 97.4 F | DIASTOLIC BLOOD PRESSURE: 72 MMHG | HEIGHT: 62 IN | SYSTOLIC BLOOD PRESSURE: 116 MMHG | WEIGHT: 180 LBS | HEART RATE: 60 BPM

## 2022-12-12 LAB
HCG UR QL: NEGATIVE
QUALITY CONTROL: YES

## 2022-12-12 PROCEDURE — 57454 BX/CURETT OF CERVIX W/SCOPE: CPT

## 2022-12-12 PROCEDURE — 99395 PREV VISIT EST AGE 18-39: CPT | Mod: 25

## 2022-12-16 LAB
A VAGINAE DNA VAG QL NAA+PROBE: ABNORMAL
BVAB2 DNA VAG QL NAA+PROBE: ABNORMAL
C KRUSEI DNA VAG QL NAA+PROBE: NEGATIVE
C TRACH RRNA SPEC QL NAA+PROBE: NEGATIVE
CORE LAB BIOPSY: NORMAL
HPV HIGH+LOW RISK DNA PNL CVX: NOT DETECTED
MEGA1 DNA VAG QL NAA+PROBE: ABNORMAL
N GONORRHOEA RRNA SPEC QL NAA+PROBE: NEGATIVE
T VAGINALIS RRNA SPEC QL NAA+PROBE: NEGATIVE

## 2022-12-30 LAB — CYTOLOGY CVX/VAG DOC THIN PREP: ABNORMAL

## 2022-12-30 NOTE — HISTORY OF PRESENT ILLNESS
[FreeTextEntry1] : 33 yo with recent LEEP for CIN2-3 presenting today for annual, colpo, and last dose of gardasil

## 2022-12-30 NOTE — PHYSICAL EXAM
[Appropriately responsive] : appropriately responsive [Alert] : alert [No Acute Distress] : no acute distress [No Lymphadenopathy] : no lymphadenopathy [Soft] : soft [Non-tender] : non-tender [Non-distended] : non-distended [No HSM] : No HSM [No Lesions] : no lesions [Oriented x3] : oriented x3 [Examination Of The Breasts] : a normal appearance [No Masses] : no breast masses were palpable [Labia Majora] : normal [Labia Minora] : normal [Normal] : normal [Uterine Adnexae] : normal

## 2022-12-30 NOTE — PLAN
[FreeTextEntry1] : \par - Post procedure counseling given--patient to practice pelvic rest for 1 week, this includes nothing in the vagina, no submerging the vagina in water, no douching, no tampons\par \par \par I spent the time noted on the day of this patient encounter preparing for, providing and documenting the above service. I have  counseled and educated the patient on the differential, workup, disease course, and treatment/management plan. Education was provided to the patient during this encounter. All questions and concerns were answered and addressed in detail.\par \par Zulay Pierre MD\par

## 2022-12-30 NOTE — COUNSELING
[Nutrition/ Exercise/ Weight Management] : nutrition, exercise, weight management [Breast Self Exam] : breast self exam [Bladder Hygiene] : bladder hygiene [Confidentiality] : confidentiality [STD (testing, results, tx)] : STD (testing, results, tx) [HPV Vaccine] : HPV Vaccine [Pre/Post Op Instructions] : pre/post op instructions

## 2022-12-30 NOTE — PROCEDURE
[Colposcopy] : Colposcopy  [Risks] : risks [Benefits] : benefits [Alternatives] : alternatives [Patient] : patient [Infection] : infection [Bleeding] : bleeding [Allergic Reaction] : allergic reaction [No Premedication] : no premedication [Colposcopy Adequate] : colposcopy adequate [Pap Performed] : pap performed [ECC Performed] : ECC performed [No Abnormalities] : no abnormalities [Hemostasis Obtained] : Hemostasis obtained [Tolerated Well] : the patient tolerated the procedure well [de-identified] : history of HGSIL

## 2023-01-12 ENCOUNTER — NON-APPOINTMENT (OUTPATIENT)
Age: 33
End: 2023-01-12

## 2023-01-12 ENCOUNTER — APPOINTMENT (OUTPATIENT)
Dept: FAMILY MEDICINE | Facility: CLINIC | Age: 33
End: 2023-01-12
Payer: COMMERCIAL

## 2023-01-12 VITALS
OXYGEN SATURATION: 99 % | WEIGHT: 184 LBS | DIASTOLIC BLOOD PRESSURE: 83 MMHG | RESPIRATION RATE: 14 BRPM | HEIGHT: 62 IN | SYSTOLIC BLOOD PRESSURE: 137 MMHG | HEART RATE: 88 BPM | TEMPERATURE: 98.1 F | BODY MASS INDEX: 33.86 KG/M2

## 2023-01-12 DIAGNOSIS — R07.9 CHEST PAIN, UNSPECIFIED: ICD-10-CM

## 2023-01-12 DIAGNOSIS — R63.5 ABNORMAL WEIGHT GAIN: ICD-10-CM

## 2023-01-12 DIAGNOSIS — R51.9 HEADACHE, UNSPECIFIED: ICD-10-CM

## 2023-01-12 DIAGNOSIS — M79.18 MYALGIA, OTHER SITE: ICD-10-CM

## 2023-01-12 PROCEDURE — 99214 OFFICE O/P EST MOD 30 MIN: CPT | Mod: 25

## 2023-01-12 PROCEDURE — 93000 ELECTROCARDIOGRAM COMPLETE: CPT

## 2023-01-12 RX ORDER — NITROFURANTOIN (MONOHYDRATE/MACROCRYSTALS) 25; 75 MG/1; MG/1
100 CAPSULE ORAL
Qty: 10 | Refills: 1 | Status: DISCONTINUED | COMMUNITY
Start: 2021-11-19 | End: 2023-01-12

## 2023-01-12 RX ORDER — METRONIDAZOLE 500 MG/1
500 TABLET ORAL TWICE DAILY
Qty: 14 | Refills: 0 | Status: DISCONTINUED | COMMUNITY
Start: 2021-12-17 | End: 2023-01-12

## 2023-01-12 RX ORDER — METRONIDAZOLE 7.5 MG/G
0.75 GEL VAGINAL
Qty: 1 | Refills: 0 | Status: DISCONTINUED | COMMUNITY
Start: 2022-12-16 | End: 2023-01-12

## 2023-01-12 RX ORDER — METRONIDAZOLE 500 MG/1
500 TABLET ORAL TWICE DAILY
Qty: 14 | Refills: 0 | Status: DISCONTINUED | COMMUNITY
Start: 2022-05-25 | End: 2023-01-12

## 2023-01-12 RX ORDER — FERROUS SULFATE 325(65) MG
325 (65 FE) TABLET ORAL DAILY
Qty: 30 | Refills: 3 | Status: DISCONTINUED | COMMUNITY
Start: 2021-11-11 | End: 2023-01-12

## 2023-01-12 NOTE — HEALTH RISK ASSESSMENT
[Never] : Never [No] : In the past 12 months have you used drugs other than those required for medical reasons? No [No falls in past year] : Patient reported no falls in the past year [0] : 2) Feeling down, depressed, or hopeless: Not at all (0) [PHQ-2 Negative - No further assessment needed] : PHQ-2 Negative - No further assessment needed [de-identified] : regular [[id number]] : 0

## 2023-01-12 NOTE — COUNSELING
[Behavioral health counseling provided] : Behavioral health counseling provided [Engage in a relaxing activity] : Engage in a relaxing activity [Needs reinforcement, provided] : Patient needs reinforcement on understanding of lifestyle changes and steps needed to achieve self management goal; reinforcement was provided

## 2023-01-12 NOTE — PLAN
[FreeTextEntry1] : EKG NSR .\par  Weight management referral.\par  labs ordered.\par  f/u gyn.\par  CXR ordered.\par Brain imaging ,keep headache diary, NSAID for pain relief.\par  f/u to discuss results. \par Heat therapy for abdominal wall tenderness.

## 2023-01-12 NOTE — HISTORY OF PRESENT ILLNESS
[FreeTextEntry8] : She is concerned about 30 pound weight gain . c/o  headache that have increased in frequency and duration- Advil helps relieve headache, previous h/o migraines.  Klonopin helps her fall asleep. She had food sensitivity test done by naturopathic doctor 2 weeks ago. \par Her periods are irregular in last 3 months, cycles  lasting 28 days to 25 days. She exercises regularly and eats healthy diet. \par  There is FH of pancreatic cancer. She had pleuritic chest pain , which started after  she quit smoking few weeks ago. it is sporadic and is chronic. she feels muscle tenderness in midline epigastric started 4 weeks ago. She lifts weights at gym. c/o constipation.

## 2023-01-12 NOTE — PHYSICAL EXAM
[No Acute Distress] : no acute distress [Well Nourished] : well nourished [Well Developed] : well developed [Well-Appearing] : well-appearing [Normal Sclera/Conjunctiva] : normal sclera/conjunctiva [PERRL] : pupils equal round and reactive to light [EOMI] : extraocular movements intact [Normal Outer Ear/Nose] : the outer ears and nose were normal in appearance [Normal Oropharynx] : the oropharynx was normal [No JVD] : no jugular venous distention [No Lymphadenopathy] : no lymphadenopathy [Supple] : supple [Thyroid Normal, No Nodules] : the thyroid was normal and there were no nodules present [No Respiratory Distress] : no respiratory distress  [No Accessory Muscle Use] : no accessory muscle use [Clear to Auscultation] : lungs were clear to auscultation bilaterally [Normal Rate] : normal rate  [Regular Rhythm] : with a regular rhythm [Normal S1, S2] : normal S1 and S2 [No Murmur] : no murmur heard [No Carotid Bruits] : no carotid bruits [No Abdominal Bruit] : a ~M bruit was not heard ~T in the abdomen [No Varicosities] : no varicosities [Pedal Pulses Present] : the pedal pulses are present [No Edema] : there was no peripheral edema [No Palpable Aorta] : no palpable aorta [No Extremity Clubbing/Cyanosis] : no extremity clubbing/cyanosis [Soft] : abdomen soft [Non Tender] : non-tender [Non-distended] : non-distended [No Masses] : no abdominal mass palpated [No HSM] : no HSM [Normal Bowel Sounds] : normal bowel sounds [Normal Posterior Cervical Nodes] : no posterior cervical lymphadenopathy [Normal Anterior Cervical Nodes] : no anterior cervical lymphadenopathy [No CVA Tenderness] : no CVA  tenderness [No Spinal Tenderness] : no spinal tenderness [No Joint Swelling] : no joint swelling [Grossly Normal Strength/Tone] : grossly normal strength/tone [No Rash] : no rash [Coordination Grossly Intact] : coordination grossly intact [No Focal Deficits] : no focal deficits [Normal Gait] : normal gait [Deep Tendon Reflexes (DTR)] : deep tendon reflexes were 2+ and symmetric [Normal Affect] : the affect was normal [Normal Insight/Judgement] : insight and judgment were intact [de-identified] : tender on right lateral border of rectus abdominus in epoigastric area

## 2023-07-11 ENCOUNTER — APPOINTMENT (OUTPATIENT)
Dept: BARIATRICS/WEIGHT MGMT | Facility: CLINIC | Age: 33
End: 2023-07-11

## 2024-05-20 ENCOUNTER — APPOINTMENT (OUTPATIENT)
Age: 34
End: 2024-05-20

## 2024-07-30 ENCOUNTER — APPOINTMENT (OUTPATIENT)
Dept: OBGYN | Facility: CLINIC | Age: 34
End: 2024-07-30
Payer: COMMERCIAL

## 2024-07-30 VITALS
HEIGHT: 62 IN | SYSTOLIC BLOOD PRESSURE: 112 MMHG | WEIGHT: 168 LBS | HEART RATE: 98 BPM | TEMPERATURE: 97.2 F | DIASTOLIC BLOOD PRESSURE: 74 MMHG | BODY MASS INDEX: 30.91 KG/M2 | OXYGEN SATURATION: 98 %

## 2024-07-30 DIAGNOSIS — N76.0 ACUTE VAGINITIS: ICD-10-CM

## 2024-07-30 LAB
HCG UR QL: NEGATIVE
QUALITY CONTROL: YES

## 2024-07-30 PROCEDURE — 99395 PREV VISIT EST AGE 18-39: CPT

## 2024-07-30 PROCEDURE — 99459 PELVIC EXAMINATION: CPT

## 2024-07-30 NOTE — PHYSICAL EXAM
[Chaperone Present] : A chaperone was present in the examining room during all aspects of the physical examination [[id number]] : A chaperone was present during the pelvic exam. [Appropriately responsive] : appropriately responsive [Alert] : alert [No Acute Distress] : no acute distress [No Lymphadenopathy] : no lymphadenopathy [Soft] : soft [Non-tender] : non-tender [Non-distended] : non-distended [No HSM] : No HSM [No Lesions] : no lesions [No Mass] : no mass [Oriented x3] : oriented x3 [Examination Of The Breasts] : a normal appearance [No Masses] : no breast masses were palpable [Labia Majora] : normal [Labia Minora] : normal [Normal] : normal [Uterine Adnexae] : normal

## 2024-07-30 NOTE — HISTORY OF PRESENT ILLNESS
[FreeTextEntry1] : 33 yo here today for annual, NC. Stepmother recently diagnosed with Stage 4 lung cancer.

## 2024-07-31 ENCOUNTER — NON-APPOINTMENT (OUTPATIENT)
Age: 34
End: 2024-07-31

## 2024-08-09 LAB
C TRACH RRNA SPEC QL NAA+PROBE: NOT DETECTED
CANDIDA VAG CYTO: NOT DETECTED
CYTOLOGY CVX/VAG DOC THIN PREP: ABNORMAL
G VAGINALIS+PREV SP MTYP VAG QL MICRO: NOT DETECTED
HPV HIGH+LOW RISK DNA PNL CVX: NOT DETECTED
N GONORRHOEA RRNA SPEC QL NAA+PROBE: NOT DETECTED
SOURCE AMPLIFICATION: NORMAL
T VAGINALIS VAG QL WET PREP: NOT DETECTED

## 2024-10-21 ENCOUNTER — APPOINTMENT (OUTPATIENT)
Dept: FAMILY MEDICINE | Facility: CLINIC | Age: 34
End: 2024-10-21

## 2025-07-09 ENCOUNTER — APPOINTMENT (OUTPATIENT)
Dept: FAMILY MEDICINE | Facility: CLINIC | Age: 35
End: 2025-07-09

## 2025-08-07 ENCOUNTER — EMERGENCY (EMERGENCY)
Facility: HOSPITAL | Age: 35
LOS: 1 days | End: 2025-08-07
Attending: EMERGENCY MEDICINE | Admitting: EMERGENCY MEDICINE
Payer: COMMERCIAL

## 2025-08-07 ENCOUNTER — NON-APPOINTMENT (OUTPATIENT)
Age: 35
End: 2025-08-07

## 2025-08-07 VITALS
OXYGEN SATURATION: 99 % | SYSTOLIC BLOOD PRESSURE: 120 MMHG | HEART RATE: 84 BPM | DIASTOLIC BLOOD PRESSURE: 76 MMHG | RESPIRATION RATE: 17 BRPM

## 2025-08-07 VITALS
HEART RATE: 100 BPM | DIASTOLIC BLOOD PRESSURE: 80 MMHG | TEMPERATURE: 99 F | HEIGHT: 62 IN | WEIGHT: 149.91 LBS | RESPIRATION RATE: 16 BRPM | OXYGEN SATURATION: 100 % | SYSTOLIC BLOOD PRESSURE: 114 MMHG

## 2025-08-07 DIAGNOSIS — Z98.890 OTHER SPECIFIED POSTPROCEDURAL STATES: Chronic | ICD-10-CM

## 2025-08-07 LAB
ALBUMIN SERPL ELPH-MCNC: 3.6 G/DL — SIGNIFICANT CHANGE UP (ref 3.3–5)
ALP SERPL-CCNC: 41 U/L — SIGNIFICANT CHANGE UP (ref 40–120)
ALT FLD-CCNC: 13 U/L — SIGNIFICANT CHANGE UP (ref 10–45)
ANION GAP SERPL CALC-SCNC: 6 MMOL/L — SIGNIFICANT CHANGE UP (ref 5–17)
APPEARANCE UR: CLEAR — SIGNIFICANT CHANGE UP
AST SERPL-CCNC: 11 U/L — SIGNIFICANT CHANGE UP (ref 10–40)
BACTERIA # UR AUTO: ABNORMAL /HPF
BASOPHILS # BLD AUTO: 0.03 K/UL — SIGNIFICANT CHANGE UP (ref 0–0.2)
BASOPHILS NFR BLD AUTO: 0.2 % — SIGNIFICANT CHANGE UP (ref 0–2)
BILIRUB SERPL-MCNC: 0.6 MG/DL — SIGNIFICANT CHANGE UP (ref 0.2–1.2)
BILIRUB UR-MCNC: NEGATIVE — SIGNIFICANT CHANGE UP
BUN SERPL-MCNC: 14 MG/DL — SIGNIFICANT CHANGE UP (ref 7–23)
CALCIUM SERPL-MCNC: 8.5 MG/DL — SIGNIFICANT CHANGE UP (ref 8.4–10.5)
CHLORIDE SERPL-SCNC: 106 MMOL/L — SIGNIFICANT CHANGE UP (ref 96–108)
CO2 SERPL-SCNC: 27 MMOL/L — SIGNIFICANT CHANGE UP (ref 22–31)
COLOR SPEC: YELLOW — SIGNIFICANT CHANGE UP
CREAT SERPL-MCNC: 0.53 MG/DL — SIGNIFICANT CHANGE UP (ref 0.5–1.3)
DIFF PNL FLD: NEGATIVE — SIGNIFICANT CHANGE UP
EGFR: 122 ML/MIN/1.73M2 — SIGNIFICANT CHANGE UP
EGFR: 122 ML/MIN/1.73M2 — SIGNIFICANT CHANGE UP
EOSINOPHIL # BLD AUTO: 0.11 K/UL — SIGNIFICANT CHANGE UP (ref 0–0.5)
EOSINOPHIL NFR BLD AUTO: 0.6 % — SIGNIFICANT CHANGE UP (ref 0–6)
EPI CELLS # UR: PRESENT
FLUAV AG NPH QL: SIGNIFICANT CHANGE UP
FLUBV AG NPH QL: SIGNIFICANT CHANGE UP
GLUCOSE SERPL-MCNC: 92 MG/DL — SIGNIFICANT CHANGE UP (ref 70–99)
GLUCOSE UR QL: NEGATIVE MG/DL — SIGNIFICANT CHANGE UP
HCG SERPL-ACNC: <1 MIU/ML — SIGNIFICANT CHANGE UP
HCT VFR BLD CALC: 38.4 % — SIGNIFICANT CHANGE UP (ref 34.5–45)
HGB BLD-MCNC: 12.5 G/DL — SIGNIFICANT CHANGE UP (ref 11.5–15.5)
IMM GRANULOCYTES NFR BLD AUTO: 0.3 % — SIGNIFICANT CHANGE UP (ref 0–0.9)
KETONES UR QL: NEGATIVE MG/DL — SIGNIFICANT CHANGE UP
LEUKOCYTE ESTERASE UR-ACNC: ABNORMAL
LIDOCAIN IGE QN: 30 U/L — SIGNIFICANT CHANGE UP (ref 16–77)
LYMPHOCYTES # BLD AUTO: 0.85 K/UL — LOW (ref 1–3.3)
LYMPHOCYTES # BLD AUTO: 4.8 % — LOW (ref 13–44)
MCHC RBC-ENTMCNC: 28.4 PG — SIGNIFICANT CHANGE UP (ref 27–34)
MCHC RBC-ENTMCNC: 32.6 G/DL — SIGNIFICANT CHANGE UP (ref 32–36)
MCV RBC AUTO: 87.3 FL — SIGNIFICANT CHANGE UP (ref 80–100)
MONOCYTES # BLD AUTO: 0.94 K/UL — HIGH (ref 0–0.9)
MONOCYTES NFR BLD AUTO: 5.3 % — SIGNIFICANT CHANGE UP (ref 2–14)
NEUTROPHILS # BLD AUTO: 15.86 K/UL — HIGH (ref 1.8–7.4)
NEUTROPHILS NFR BLD AUTO: 88.8 % — HIGH (ref 43–77)
NITRITE UR-MCNC: POSITIVE
NRBC BLD AUTO-RTO: 0 /100 WBCS — SIGNIFICANT CHANGE UP (ref 0–0)
PH UR: 6 — SIGNIFICANT CHANGE UP (ref 5–8)
PLATELET # BLD AUTO: 388 K/UL — SIGNIFICANT CHANGE UP (ref 150–400)
POTASSIUM SERPL-MCNC: 3.9 MMOL/L — SIGNIFICANT CHANGE UP (ref 3.5–5.3)
POTASSIUM SERPL-SCNC: 3.9 MMOL/L — SIGNIFICANT CHANGE UP (ref 3.5–5.3)
PROT SERPL-MCNC: 6.4 G/DL — SIGNIFICANT CHANGE UP (ref 6–8.3)
PROT UR-MCNC: NEGATIVE MG/DL — SIGNIFICANT CHANGE UP
RBC # BLD: 4.4 M/UL — SIGNIFICANT CHANGE UP (ref 3.8–5.2)
RBC # FLD: 14.2 % — SIGNIFICANT CHANGE UP (ref 10.3–14.5)
RBC CASTS # UR COMP ASSIST: 0 /HPF — SIGNIFICANT CHANGE UP (ref 0–4)
RSV RNA NPH QL NAA+NON-PROBE: SIGNIFICANT CHANGE UP
SARS-COV-2 RNA SPEC QL NAA+PROBE: SIGNIFICANT CHANGE UP
SODIUM SERPL-SCNC: 139 MMOL/L — SIGNIFICANT CHANGE UP (ref 135–145)
SOURCE RESPIRATORY: SIGNIFICANT CHANGE UP
SP GR SPEC: 1.02 — SIGNIFICANT CHANGE UP (ref 1–1.03)
UROBILINOGEN FLD QL: 0.2 MG/DL — SIGNIFICANT CHANGE UP (ref 0.2–1)
WBC # BLD: 17.85 K/UL — HIGH (ref 3.8–10.5)
WBC # FLD AUTO: 17.85 K/UL — HIGH (ref 3.8–10.5)
WBC UR QL: 10 /HPF — HIGH (ref 0–5)

## 2025-08-07 PROCEDURE — 36415 COLL VENOUS BLD VENIPUNCTURE: CPT

## 2025-08-07 PROCEDURE — 85025 COMPLETE CBC W/AUTO DIFF WBC: CPT

## 2025-08-07 PROCEDURE — 96374 THER/PROPH/DIAG INJ IV PUSH: CPT | Mod: XU

## 2025-08-07 PROCEDURE — 84702 CHORIONIC GONADOTROPIN TEST: CPT

## 2025-08-07 PROCEDURE — 74177 CT ABD & PELVIS W/CONTRAST: CPT

## 2025-08-07 PROCEDURE — 71045 X-RAY EXAM CHEST 1 VIEW: CPT | Mod: 26

## 2025-08-07 PROCEDURE — 81001 URINALYSIS AUTO W/SCOPE: CPT

## 2025-08-07 PROCEDURE — 87086 URINE CULTURE/COLONY COUNT: CPT

## 2025-08-07 PROCEDURE — 99285 EMERGENCY DEPT VISIT HI MDM: CPT

## 2025-08-07 PROCEDURE — 87637 SARSCOV2&INF A&B&RSV AMP PRB: CPT

## 2025-08-07 PROCEDURE — 74177 CT ABD & PELVIS W/CONTRAST: CPT | Mod: 26

## 2025-08-07 PROCEDURE — 96375 TX/PRO/DX INJ NEW DRUG ADDON: CPT

## 2025-08-07 PROCEDURE — 80053 COMPREHEN METABOLIC PANEL: CPT

## 2025-08-07 PROCEDURE — 96361 HYDRATE IV INFUSION ADD-ON: CPT

## 2025-08-07 PROCEDURE — 87186 SC STD MICRODIL/AGAR DIL: CPT

## 2025-08-07 PROCEDURE — 71045 X-RAY EXAM CHEST 1 VIEW: CPT

## 2025-08-07 PROCEDURE — 83690 ASSAY OF LIPASE: CPT

## 2025-08-07 PROCEDURE — 99284 EMERGENCY DEPT VISIT MOD MDM: CPT | Mod: 25

## 2025-08-07 RX ORDER — AMOXICILLIN AND CLAVULANATE POTASSIUM 500; 125 MG/1; MG/1
1 TABLET, FILM COATED ORAL ONCE
Refills: 0 | Status: COMPLETED | OUTPATIENT
Start: 2025-08-07 | End: 2025-08-07

## 2025-08-07 RX ORDER — ONDANSETRON HCL/PF 4 MG/2 ML
1 VIAL (ML) INJECTION
Qty: 21 | Refills: 0
Start: 2025-08-07 | End: 2025-08-13

## 2025-08-07 RX ORDER — ONDANSETRON HCL/PF 4 MG/2 ML
4 VIAL (ML) INJECTION ONCE
Refills: 0 | Status: COMPLETED | OUTPATIENT
Start: 2025-08-07 | End: 2025-08-07

## 2025-08-07 RX ORDER — ACETAMINOPHEN 500 MG/5ML
2 LIQUID (ML) ORAL
Qty: 30 | Refills: 0
Start: 2025-08-07 | End: 2025-08-11

## 2025-08-07 RX ORDER — ACETAMINOPHEN 500 MG/5ML
975 LIQUID (ML) ORAL ONCE
Refills: 0 | Status: COMPLETED | OUTPATIENT
Start: 2025-08-07 | End: 2025-08-07

## 2025-08-07 RX ORDER — ACETAMINOPHEN 500 MG/5ML
1000 LIQUID (ML) ORAL ONCE
Refills: 0 | Status: DISCONTINUED | OUTPATIENT
Start: 2025-08-07 | End: 2025-08-07

## 2025-08-07 RX ORDER — AMOXICILLIN AND CLAVULANATE POTASSIUM 500; 125 MG/1; MG/1
875 TABLET, FILM COATED ORAL
Qty: 20 | Refills: 0
Start: 2025-08-07 | End: 2025-08-16

## 2025-08-07 RX ADMIN — Medication 4 MILLIGRAM(S): at 17:10

## 2025-08-07 RX ADMIN — Medication 1000 MILLILITER(S): at 18:46

## 2025-08-07 RX ADMIN — Medication 4 MILLIGRAM(S): at 19:57

## 2025-08-07 RX ADMIN — Medication 1000 MILLILITER(S): at 17:46

## 2025-08-07 RX ADMIN — Medication 1000 MILLILITER(S): at 18:10

## 2025-08-07 RX ADMIN — Medication 975 MILLIGRAM(S): at 19:57

## 2025-08-07 RX ADMIN — AMOXICILLIN AND CLAVULANATE POTASSIUM 1 TABLET(S): 500; 125 TABLET, FILM COATED ORAL at 19:57

## 2025-08-07 RX ADMIN — Medication 1000 MILLILITER(S): at 17:10

## 2025-08-07 RX ADMIN — Medication 20 MILLIGRAM(S): at 17:11

## 2025-08-11 LAB
-  AMOXICILLIN/CLAVULANIC ACID: SIGNIFICANT CHANGE UP
-  AMPICILLIN/SULBACTAM: SIGNIFICANT CHANGE UP
-  AMPICILLIN: SIGNIFICANT CHANGE UP
-  AZTREONAM: SIGNIFICANT CHANGE UP
-  CEFAZOLIN: SIGNIFICANT CHANGE UP
-  CEFEPIME: SIGNIFICANT CHANGE UP
-  CEFOXITIN: SIGNIFICANT CHANGE UP
-  CEFTRIAXONE: SIGNIFICANT CHANGE UP
-  CEFUROXIME: SIGNIFICANT CHANGE UP
-  CIPROFLOXACIN: SIGNIFICANT CHANGE UP
-  ERTAPENEM: SIGNIFICANT CHANGE UP
-  GENTAMICIN: SIGNIFICANT CHANGE UP
-  IMIPENEM: SIGNIFICANT CHANGE UP
-  LEVOFLOXACIN: SIGNIFICANT CHANGE UP
-  MEROPENEM: SIGNIFICANT CHANGE UP
-  NITROFURANTOIN: SIGNIFICANT CHANGE UP
-  PIPERACILLIN/TAZOBACTAM: SIGNIFICANT CHANGE UP
-  TIGECYCLINE: SIGNIFICANT CHANGE UP
-  TOBRAMYCIN: SIGNIFICANT CHANGE UP
-  TRIMETHOPRIM/SULFAMETHOXAZOLE: SIGNIFICANT CHANGE UP
CULTURE RESULTS: ABNORMAL
METHOD TYPE: SIGNIFICANT CHANGE UP
ORGANISM # SPEC MICROSCOPIC CNT: ABNORMAL
ORGANISM # SPEC MICROSCOPIC CNT: SIGNIFICANT CHANGE UP
SPECIMEN SOURCE: SIGNIFICANT CHANGE UP

## (undated) DEVICE — DRAPE 3/4 SHEET W REINFORCEMENT 56X77"

## (undated) DEVICE — GLV 7 PROTEXIS

## (undated) DEVICE — PACK LITHOTOMY

## (undated) DEVICE — GLV 6.5 PROTEXIS

## (undated) DEVICE — WRAP COMPRESSION CALF MED

## (undated) DEVICE — POSITIONER STRAP ARMBOARD VELCRO TS-30 12

## (undated) DEVICE — CONTAINER SPECIMEN PET

## (undated) DEVICE — TUBING UTERINE ASPIRATION 3/8" X 6FT W/O ADAPTER

## (undated) DEVICE — CURETTE UTERINE VACURETTE STRAIGHT 11MM

## (undated) DEVICE — ELCTR LOOP FOR LLETZ 15MM X 12MM

## (undated) DEVICE — CURETTE UTERINE VACURETTE CURVED 8MM

## (undated) DEVICE — CONTAINER SPECIMEN 100ML

## (undated) DEVICE — GLV 8 PROTEXIS

## (undated) DEVICE — POSITIONER FOAM HEAD CRADLE

## (undated) DEVICE — GLV 7.5 PROTEXIS

## (undated) DEVICE — BLANKET WARMER UPPER ADULT

## (undated) DEVICE — VACURETTE CURVED 7MM

## (undated) DEVICE — VACURETTE CURVED 9MM